# Patient Record
Sex: MALE | Race: WHITE
[De-identification: names, ages, dates, MRNs, and addresses within clinical notes are randomized per-mention and may not be internally consistent; named-entity substitution may affect disease eponyms.]

---

## 2017-10-05 ENCOUNTER — HOSPITAL ENCOUNTER (OUTPATIENT)
Dept: HOSPITAL 10 - U/S | Age: 67
Discharge: HOME | End: 2017-10-05
Payer: MEDICARE

## 2017-10-05 DIAGNOSIS — R10.9: Primary | ICD-10-CM

## 2017-10-05 PROCEDURE — 76775 US EXAM ABDO BACK WALL LIM: CPT

## 2017-10-05 NOTE — RADRPT
PROCEDURE:   Renal US. 

 

CLINICAL INDICATION:   Pain 

 

TECHNIQUE:   Multiple sonographic images of the kidneys were obtained.  The images were reviewed on 
a PACS workstation. 

 

COMPARISON:   No prior studies are available for comparison. 

 

FINDINGS:

The kidneys are well visualized. The right kidney measures 11.1 cm. The left kidney measures 10.7 cm
. There is bilateral cortical thinning a an increased echogenicity of the kidneys. There is no evide
nce for obstructive uropathy or nephrolithiasis. Thickened bladder wall, measuring up to 5 mm. There
 are two small parapelvic cysts in the right kidney, the largest measuring up to 1.4 cm.

 

IMPRESSION:

 

Echogenic kidneys suggestive of medical renal disease.

 

Mild bladder wall thickening up to 5 mm, nonspecific.

 

RPTAT: QQ

_____________________________________________ 

Physician Tatiana           Date    Time 

Electronically viewed and signed by Physician Tatiana on 10/05/2017 13:51 

 

D:  10/05/2017 13:51  T:  10/05/2017 13:51

/

## 2018-03-09 ENCOUNTER — HOSPITAL ENCOUNTER (EMERGENCY)
Age: 68
Discharge: HOME | End: 2018-03-09

## 2018-03-09 ENCOUNTER — HOSPITAL ENCOUNTER (EMERGENCY)
Dept: HOSPITAL 91 - E/R | Age: 68
Discharge: HOME | End: 2018-03-09
Payer: MEDICARE

## 2018-03-09 DIAGNOSIS — K62.5: Primary | ICD-10-CM

## 2018-03-09 DIAGNOSIS — I10: ICD-10-CM

## 2018-03-09 DIAGNOSIS — K64.4: ICD-10-CM

## 2018-03-09 LAB
ADD MAN DIFF?: NO
ALANINE AMINOTRANSFERASE: 28 IU/L (ref 13–69)
ALBUMIN/GLOBULIN RATIO: 1.07
ALBUMIN: 4.4 G/DL (ref 3.3–4.9)
ALKALINE PHOSPHATASE: 96 IU/L (ref 42–121)
ANION GAP: 16 (ref 8–16)
ASPARTATE AMINO TRANSFERASE: 31 IU/L (ref 15–46)
BASOPHIL #: 0.1 10^3/UL (ref 0–0.1)
BASOPHILS %: 0.3 % (ref 0–2)
BILIRUBIN,DIRECT: 0 MG/DL (ref 0–0.2)
BILIRUBIN,TOTAL: 1.2 MG/DL (ref 0.2–1.3)
BLOOD UREA NITROGEN: 8 MG/DL (ref 7–20)
CALCIUM: 9.7 MG/DL (ref 8.4–10.2)
CARBON DIOXIDE: 23 MMOL/L (ref 21–31)
CHLORIDE: 98 MMOL/L (ref 97–110)
CREATININE: 0.59 MG/DL (ref 0.61–1.24)
EOSINOPHILS #: 0.1 10^3/UL (ref 0–0.5)
EOSINOPHILS %: 0.6 % (ref 0–7)
GLOBULIN: 4.1 G/DL (ref 1.3–3.2)
GLUCOSE: 101 MG/DL (ref 70–220)
HEMATOCRIT: 39.1 % (ref 42–52)
HEMOGLOBIN: 13.8 G/DL (ref 14–18)
INR: 1.03
LIPASE: 52 U/L (ref 23–300)
LYMPHOCYTES #: 2.1 10^3/UL (ref 0.8–2.9)
LYMPHOCYTES %: 13.3 % (ref 15–51)
MEAN CORPUSCULAR HEMOGLOBIN: 30.4 PG (ref 29–33)
MEAN CORPUSCULAR HGB CONC: 35.3 G/DL (ref 32–37)
MEAN CORPUSCULAR VOLUME: 86.1 FL (ref 82–101)
MEAN PLATELET VOLUME: 12.1 FL (ref 7.4–10.4)
MONOCYTE #: 1.1 10^3/UL (ref 0.3–0.9)
MONOCYTES %: 7 % (ref 0–11)
NEUTROPHIL #: 12.2 10^3/UL (ref 1.6–7.5)
NEUTROPHILS %: 78.3 % (ref 39–77)
NUCLEATED RED BLOOD CELLS #: 0 10^3/UL (ref 0–0)
NUCLEATED RED BLOOD CELLS%: 0 /100WBC (ref 0–0)
OCCULT BLOOD STOOL: NEGATIVE
PARTIAL THROMBOPLASTIN TIME: 36.2 SEC (ref 25–35)
PLATELET COUNT: 264 10^3/UL (ref 140–415)
POTASSIUM: 3.8 MMOL/L (ref 3.5–5.1)
PROTIME: 13.6 SEC (ref 11.9–14.9)
PT RATIO: 1.1
RED BLOOD COUNT: 4.54 10^6/UL (ref 4.7–6.1)
RED CELL DISTRIBUTION WIDTH: 12.4 % (ref 11.5–14.5)
SODIUM: 133 MMOL/L (ref 135–144)
TOTAL PROTEIN: 8.5 G/DL (ref 6.1–8.1)
TROPONIN-I: < 0.012 NG/ML (ref 0–0.12)
WHITE BLOOD COUNT: 15.5 10^3/UL (ref 4.8–10.8)

## 2018-03-09 PROCEDURE — 80053 COMPREHEN METABOLIC PANEL: CPT

## 2018-03-09 PROCEDURE — 82270 OCCULT BLOOD FECES: CPT

## 2018-03-09 PROCEDURE — 86850 RBC ANTIBODY SCREEN: CPT

## 2018-03-09 PROCEDURE — 83690 ASSAY OF LIPASE: CPT

## 2018-03-09 PROCEDURE — 85610 PROTHROMBIN TIME: CPT

## 2018-03-09 PROCEDURE — 71045 X-RAY EXAM CHEST 1 VIEW: CPT

## 2018-03-09 PROCEDURE — 85730 THROMBOPLASTIN TIME PARTIAL: CPT

## 2018-03-09 PROCEDURE — 36415 COLL VENOUS BLD VENIPUNCTURE: CPT

## 2018-03-09 PROCEDURE — 86901 BLOOD TYPING SEROLOGIC RH(D): CPT

## 2018-03-09 PROCEDURE — 99285 EMERGENCY DEPT VISIT HI MDM: CPT

## 2018-03-09 PROCEDURE — 86900 BLOOD TYPING SEROLOGIC ABO: CPT

## 2018-03-09 PROCEDURE — 84484 ASSAY OF TROPONIN QUANT: CPT

## 2018-03-09 PROCEDURE — 85025 COMPLETE CBC W/AUTO DIFF WBC: CPT

## 2018-03-09 PROCEDURE — 93005 ELECTROCARDIOGRAM TRACING: CPT
